# Patient Record
Sex: MALE | Race: BLACK OR AFRICAN AMERICAN | Employment: OTHER | URBAN - METROPOLITAN AREA
[De-identification: names, ages, dates, MRNs, and addresses within clinical notes are randomized per-mention and may not be internally consistent; named-entity substitution may affect disease eponyms.]

---

## 2020-09-18 ENCOUNTER — VIRTUAL VISIT (OUTPATIENT)
Dept: FAMILY MEDICINE CLINIC | Age: 35
End: 2020-09-18
Payer: COMMERCIAL

## 2020-09-18 DIAGNOSIS — Z00.00 VISIT FOR WELL MAN HEALTH CHECK: ICD-10-CM

## 2020-09-18 DIAGNOSIS — R10.9 ABDOMINAL BLOATING WITH CRAMPS: Primary | ICD-10-CM

## 2020-09-18 DIAGNOSIS — R14.0 ABDOMINAL BLOATING WITH CRAMPS: Primary | ICD-10-CM

## 2020-09-18 PROCEDURE — 99204 OFFICE O/P NEW MOD 45 MIN: CPT | Performed by: STUDENT IN AN ORGANIZED HEALTH CARE EDUCATION/TRAINING PROGRAM

## 2020-09-18 RX ORDER — PANTOPRAZOLE SODIUM 40 MG/1
40 TABLET, DELAYED RELEASE ORAL DAILY
Qty: 30 TAB | Refills: 0 | Status: SHIPPED | OUTPATIENT
Start: 2020-09-18

## 2020-09-18 NOTE — PROGRESS NOTES
Angela Fabianshira Jack  36 y.o. male  1985  [id number] [facility]  [id number]   [address]:    Telemedicine Progress Note  Asmita Henriquez MD       Encounter Date and Time: September 18, 2020 at 10:59 AM    Consent: Bobbi Juan, who was seen by synchronous (real-time) audio-video technology, and/or his healthcare decision maker, is aware that this patient-initiated, Telehealth encounter on 9/18/2020 is a billable service, with coverage as determined by his insurance carrier. He is aware that he may receive a bill and has provided verbal consent to proceed: Yes. CC: Establish Care    History of Present Illness   Bobbi Juan is a 36 y.o. male was evaluated by synchronous (real-time) audio-video technology from home, through a secure patient portal.    Here today to establish care. Has no acute complaints or concerns. Intermittent Abdominal bloating usually worse in morning but occasionally occurring at night. Usually with associated cramping pain lasting about 1 minute and occurring about once a month. States his stools are normal and has bowel movements daily. Denies any melena or hematochezia. Admits to having a poor diet lacking in fruits and vegetables. Was previously prescribed PPI by GI but took it inconsistently  Had EGD with mild inflammation per pt and colonoscopy with 1 polyp removed. Has not since had GI f/u      PMH: None  Allergies: NKDA  Medications: None  Family Hx: Maternal GrandMa - Lung Cacer. Surgical Hx: None  Social: Smoked 1ppd for 5 yrs. Quit smoking at ago 29. No etoh. No illicit.  3 yrs and has 4 children. Health Maintenance: UTD on TDaP. Desires Flu Vaccine.      Review of Systems   ROS    Vitals/Objective:     General: alert, cooperative, no distress   Mental  status: mental status: alert, oriented to person, place, and time, normal mood, behavior, speech, dress, motor activity, and thought processes   Resp: resp: normal effort and no respiratory distress   Neuro: neuro: no gross deficits   Skin: skin: no discoloration or lesions of concern on visible areas   Due to this being a TeleHealth evaluation, many elements of the physical examination are unable to be assessed. Assessment and Plan:   Time-based coding, delete if not needed: I spent at least 25 minutes with this established patient, and >50% of the time was spent counseling and/or coordinating care regarding establish care    1. Abdominal bloating with cramps  - Plan to obtain labs as below including H.Pylori stool ag  - Resume PPI given prev hx of inflammation and f/u with GI  - Pt made aware GI f/u is strongly recommended  - METABOLIC PANEL, COMPREHENSIVE; Future  - LIPASE; Future  - HEMOGLOBIN A1C WITH EAG; Future  - LIPID PANEL; Future  - H PYLORI AG, STOOL; Future  - pantoprazole (PROTONIX) 40 mg tablet; Take 1 Tab by mouth daily. Dispense: 30 Tab; Refill: 0  - TSH 3RD GENERATION; Future    2. Visit for well [place] health check  - UTD on health maintenance  - Await previous PCP records          Time spent in direct conversation with the patient to include medical condition(s) discussed, assessment and treatment plan:       We discussed the expected course, resolution and complications of the diagnosis(es) in detail. Medication risks, benefits, costs, interactions, and alternatives were discussed as indicated. I advised him to contact the office if his condition worsens, changes or fails to improve as anticipated. He expressed understanding with the diagnosis(es) and plan. Patient understands that this encounter was a temporary measure, and the importance of further follow up and examination was emphasized. Patient verbalized understanding. Patient informed to follow up: 4 weeks. Electronically Signed: Kp Medina MD    CPT Codes 64729-17028 for Established Patients may apply to this Telehealth Visit. POS code: 18.   Modifier TIN Mccall Fozia Bowman is a 36 y.o. male who was evaluated by an audio-video encounter for concerns as above. Patient identification was verified prior to start of the visit. A caregiver was present when appropriate. Due to this being a TeleHealth encounter (During SZXAR-93 public health emergency), evaluation of the following organ systems was limited: Vitals/Constitutional/EENT/Resp/CV/GI//MS/Neuro/Skin/Heme-Lymph-Imm. Pursuant to the emergency declaration under the [facility] [place] [id number] waiver authority and the [employer] Act, this Virtual Visit was conducted, with patient's (and/or legal guardian's) consent, to reduce the patient's risk of exposure to COVID-19 and provide necessary medical care. Services were provided through a synchronous discussion virtually to substitute for in-person clinic visit. I was at home. The patient was at home. History   Patients past medical, surgical and family histories were reviewed and updated. No past medical history on file. No past surgical history on file. No family history on file.   Social History     Socioeconomic History    Marital status:      Spouse name: Not on file    Number of children: Not on file    Years of education: Not on file    Highest education level: Not on file   Occupational History    Not on file   Social Needs    Financial resource strain: Not on file    Food insecurity     Worry: Not on file     Inability: Not on file    Transportation needs     Medical: Not on file     Non-medical: Not on file   Tobacco Use    Smoking status: Not on file   Substance and Sexual Activity    Alcohol use: Not on file    Drug use: Not on file    Sexual activity: Not on file   Lifestyle    Physical activity     Days per week: Not on file     Minutes per session: Not on file    Stress: Not on file   Relationships    Social connections     Talks on phone: Not on file     Gets together: Not on file     Attends Scientologist service: Not on file     Active member of club or organization: Not on file     Attends meetings of clubs or organizations: Not on file     Relationship status: Not on file    Intimate partner violence     Fear of current or ex partner: Not on file     Emotionally abused: Not on file     Physically abused: Not on file     Forced sexual activity: Not on file   Other Topics Concern    Not on file   Social History Narrative    Not on file     There is no problem list on file for this patient.          Current Medications/Allergies   Medications and Allergies reviewed:      Not on File

## 2020-09-21 ENCOUNTER — LAB ONLY (OUTPATIENT)
Dept: FAMILY MEDICINE CLINIC | Age: 35
End: 2020-09-21

## 2020-09-21 DIAGNOSIS — R10.9 ABDOMINAL BLOATING WITH CRAMPS: ICD-10-CM

## 2020-09-21 DIAGNOSIS — R14.0 ABDOMINAL BLOATING WITH CRAMPS: ICD-10-CM

## 2020-09-21 LAB
ALBUMIN SERPL-MCNC: 4.1 G/DL (ref 3.5–5)
ALBUMIN/GLOB SERPL: 1.3 {RATIO} (ref 1.1–2.2)
ALP SERPL-CCNC: 48 U/L (ref 45–117)
ALT SERPL-CCNC: 26 U/L (ref 12–78)
ANION GAP SERPL CALC-SCNC: 3 MMOL/L (ref 5–15)
AST SERPL-CCNC: 15 U/L (ref 15–37)
BILIRUB SERPL-MCNC: 0.9 MG/DL (ref 0.2–1)
BUN SERPL-MCNC: 12 MG/DL (ref 6–20)
BUN/CREAT SERPL: 10 (ref 12–20)
CALCIUM SERPL-MCNC: 8.9 MG/DL (ref 8.5–10.1)
CHLORIDE SERPL-SCNC: 104 MMOL/L (ref 97–108)
CHOLEST SERPL-MCNC: 146 MG/DL
CO2 SERPL-SCNC: 29 MMOL/L (ref 21–32)
COMMENT, HOLDF: NORMAL
CREAT SERPL-MCNC: 1.26 MG/DL (ref 0.7–1.3)
EST. AVERAGE GLUCOSE BLD GHB EST-MCNC: 111 MG/DL
GLOBULIN SER CALC-MCNC: 3.1 G/DL (ref 2–4)
GLUCOSE SERPL-MCNC: 88 MG/DL (ref 65–100)
HBA1C MFR BLD: 5.5 % (ref 4–5.6)
HDLC SERPL-MCNC: 49 MG/DL
HDLC SERPL: 3 {RATIO} (ref 0–5)
LDLC SERPL CALC-MCNC: 76.8 MG/DL (ref 0–100)
LIPASE SERPL-CCNC: 78 U/L (ref 73–393)
LIPID PROFILE,FLP: NORMAL
POTASSIUM SERPL-SCNC: 4.4 MMOL/L (ref 3.5–5.1)
PROT SERPL-MCNC: 7.2 G/DL (ref 6.4–8.2)
SAMPLES BEING HELD,HOLD: NORMAL
SODIUM SERPL-SCNC: 136 MMOL/L (ref 136–145)
TRIGL SERPL-MCNC: 101 MG/DL (ref ?–150)
TSH SERPL DL<=0.05 MIU/L-ACNC: 1.49 UIU/ML (ref 0.36–3.74)
VLDLC SERPL CALC-MCNC: 20.2 MG/DL

## 2020-09-25 DIAGNOSIS — R10.9 ABDOMINAL BLOATING WITH CRAMPS: ICD-10-CM

## 2020-09-25 DIAGNOSIS — R14.0 ABDOMINAL BLOATING WITH CRAMPS: ICD-10-CM

## 2020-09-25 NOTE — PROGRESS NOTES
[address] Medicine Residency Attending Addendum:  Dr. Kamaljit Strauss MD,  the patient and I were not physically present during this encounter. The resident and I are concurrently monitoring the patient care through appropriate telecommunication technology. I discussed the findings, assessment and plan with the resident and agree with the resident's findings and plan as documented in the resident's note.       Adrian Watkins MD

## 2020-09-30 LAB
H PYLORI AG STL QL IA: NEGATIVE
SPECIMEN SOURCE: NORMAL

## 2020-10-05 ENCOUNTER — TELEPHONE (OUTPATIENT)
Dept: FAMILY MEDICINE CLINIC | Age: 35
End: 2020-10-05

## 2020-10-05 NOTE — TELEPHONE ENCOUNTER
----- Message from Lee Santos sent at 10/5/2020  3:42 PM EDT -----  Regarding: Dr. Mendoza Vieira first and last name: [facility]  Reason for call: lab Results  Callback required yes/no and why: yes  Best contact number(s): [phone]  Details to clarify the request: Patients wife would like a callback regarding stool sample results, please contact [address] at [phone]

## 2020-10-08 ENCOUNTER — TELEPHONE (OUTPATIENT)
Dept: FAMILY MEDICINE CLINIC | Age: 35
End: 2020-10-08

## 2020-10-08 NOTE — TELEPHONE ENCOUNTER
Called patient to inform him of his normal test results. Thoroughly discussed lifestyle and diet changes with patient to alleviate the gas and bloating that he is complaining of.

## 2022-09-20 ENCOUNTER — NURSE TRIAGE (OUTPATIENT)
Dept: OTHER | Facility: CLINIC | Age: 37
End: 2022-09-20

## 2022-09-20 NOTE — TELEPHONE ENCOUNTER
Received call from [place] [facility] with Red Flag Complaint. Subjective: Caller states \"I pulled a hangnail a week ago. It started swelling. It is still pus there. It won't go down. It is getting bigger. \"     Current Symptoms: pus pocket around the side of fingernail. Onset: 1 week ago; worsening    Associated Symptoms: fingertip swelling    Pain Severity: 5/10; throbbing; intermittent    Temperature: denies     What has been tried: Ibuprofen once    LMP: NA Pregnant: NA    Recommended disposition: See in Office Today    Care advice provided, patient verbalizes understanding; denies any other questions or concerns; instructed to call back for any new or worsening symptoms. Patient/Caller agrees with recommended disposition; writer provided warm transfer to [facility] for appointment scheduling    Attention Provider: Thank you for allowing me to participate in the care of your patient. The patient was connected to triage in response to information provided to the [facility]. Please do not respond through this encounter as the response is not directed to a shared pool.       Reason for Disposition   Yellow pus under skin around fingernail (cuticle) or pus under fingernail    Protocols used: Finger Pain-ADULT-[place]

## 2023-05-25 RX ORDER — PANTOPRAZOLE SODIUM 40 MG/1
40 TABLET, DELAYED RELEASE ORAL DAILY
COMMUNITY
Start: 2020-09-18